# Patient Record
Sex: MALE | Race: WHITE | URBAN - METROPOLITAN AREA
[De-identification: names, ages, dates, MRNs, and addresses within clinical notes are randomized per-mention and may not be internally consistent; named-entity substitution may affect disease eponyms.]

---

## 2018-03-13 ENCOUNTER — DOCTOR'S OFFICE (OUTPATIENT)
Dept: URBAN - METROPOLITAN AREA CLINIC 125 | Facility: CLINIC | Age: 53
Setting detail: OPHTHALMOLOGY
End: 2018-03-13
Payer: COMMERCIAL

## 2018-03-13 ENCOUNTER — RX ONLY (RX ONLY)
Age: 53
End: 2018-03-13

## 2018-03-13 DIAGNOSIS — H10.233: ICD-10-CM

## 2018-03-13 PROCEDURE — 99203 OFFICE O/P NEW LOW 30 MIN: CPT | Performed by: OPHTHALMOLOGY

## 2018-03-13 ASSESSMENT — REFRACTION_MANIFEST
OU_VA: 20/
OS_VA2: 20/
OS_VA3: 20/
OS_VA1: 20/
OD_VA2: 20/
OD_VA1: 20/
OS_VA3: 20/
OD_VA1: 20/
OS_VA2: 20/
OD_VA2: 20/
OU_VA: 20/
OS_VA3: 20/
OS_VA1: 20/
OU_VA: 20/
OS_VA1: 20/
OS_VA2: 20/
OD_VA2: 20/
OD_VA3: 20/
OD_VA3: 20/
OD_VA1: 20/
OD_VA3: 20/

## 2018-03-13 ASSESSMENT — REFRACTION_CURRENTRX
OS_OVR_VA: 20/
OS_OVR_VA: 20/
OD_OVR_VA: 20/
OS_OVR_VA: 20/
OD_OVR_VA: 20/
OD_OVR_VA: 20/

## 2018-03-13 ASSESSMENT — VISUAL ACUITY
OD_BCVA: 20/50+2
OS_BCVA: 20/50

## 2018-03-13 ASSESSMENT — CONFRONTATIONAL VISUAL FIELD TEST (CVF)
OD_FINDINGS: FULL
OS_FINDINGS: FULL

## 2018-04-13 ENCOUNTER — DOCTOR'S OFFICE (OUTPATIENT)
Dept: URBAN - METROPOLITAN AREA CLINIC 125 | Facility: CLINIC | Age: 53
Setting detail: OPHTHALMOLOGY
End: 2018-04-13
Payer: COMMERCIAL

## 2018-04-13 DIAGNOSIS — H25.13: ICD-10-CM

## 2018-04-13 DIAGNOSIS — H43.812: ICD-10-CM

## 2018-04-13 DIAGNOSIS — H35.033: ICD-10-CM

## 2018-04-13 DIAGNOSIS — H43.811: ICD-10-CM

## 2018-04-13 DIAGNOSIS — H43.813: ICD-10-CM

## 2018-04-13 DIAGNOSIS — H10.233: ICD-10-CM

## 2018-04-13 PROCEDURE — 92225 OPHTHALMOSCOPY EXTENDED INITIAL: CPT | Performed by: OPHTHALMOLOGY

## 2018-04-13 PROCEDURE — 99214 OFFICE O/P EST MOD 30 MIN: CPT | Performed by: OPHTHALMOLOGY

## 2018-04-13 ASSESSMENT — VISUAL ACUITY
OD_BCVA: 20/40
OS_BCVA: 20/50

## 2018-04-13 ASSESSMENT — REFRACTION_MANIFEST
OS_VA1: 20/
OS_VA2: 20/
OD_VA1: 20/
OD_VA1: 20/
OS_VA3: 20/
OU_VA: 20/
OD_VA3: 20/
OS_VA2: 20/
OU_VA: 20/
OU_VA: 20/
OD_VA3: 20/
OS_VA3: 20/
OD_VA3: 20/
OS_VA3: 20/
OS_VA1: 20/
OD_VA2: 20/
OD_VA2: 20/
OS_VA1: 20/
OD_VA1: 20/
OS_VA2: 20/
OD_VA2: 20/

## 2018-04-13 ASSESSMENT — REFRACTION_CURRENTRX
OD_OVR_VA: 20/
OS_OVR_VA: 20/

## 2018-04-13 ASSESSMENT — REFRACTION_AUTOREFRACTION
OD_AXIS: 135
OS_SPHERE: -0.50
OD_SPHERE: -0.75
OD_CYLINDER: -0.50
OS_CYLINDER: SPH

## 2018-04-13 ASSESSMENT — CONFRONTATIONAL VISUAL FIELD TEST (CVF)
OS_FINDINGS: FULL
OD_FINDINGS: FULL

## 2018-04-13 ASSESSMENT — SPHEQUIV_DERIVED: OD_SPHEQUIV: -1

## 2018-04-24 ENCOUNTER — DOCTOR'S OFFICE (OUTPATIENT)
Dept: URBAN - METROPOLITAN AREA CLINIC 125 | Facility: CLINIC | Age: 53
Setting detail: OPHTHALMOLOGY
End: 2018-04-24
Payer: COMMERCIAL

## 2018-04-24 ENCOUNTER — RX ONLY (RX ONLY)
Age: 53
End: 2018-04-24

## 2018-04-24 DIAGNOSIS — H52.13: ICD-10-CM

## 2018-04-24 PROBLEM — H10.233: Status: ACTIVE | Noted: 2018-03-13

## 2018-04-24 PROBLEM — H35.033 HYPERTENSIVE RETINOPATHY; BOTH EYES: Status: ACTIVE | Noted: 2018-04-13

## 2018-04-24 PROBLEM — H43.812 POST VITREOUS DETACHMENT; RIGHT EYE, LEFT EYE: Status: ACTIVE | Noted: 2018-04-13

## 2018-04-24 PROBLEM — H43.811 POST VITREOUS DETACHMENT; RIGHT EYE, LEFT EYE: Status: ACTIVE | Noted: 2018-04-13

## 2018-04-24 PROBLEM — H25.13 CATARACT NUCLEAR SCLEROSIS; BOTH EYES: Status: ACTIVE | Noted: 2018-04-13

## 2018-04-24 PROCEDURE — 92015 DETERMINE REFRACTIVE STATE: CPT | Performed by: OPTOMETRIST

## 2018-04-24 ASSESSMENT — REFRACTION_MANIFEST
OD_SPHERE: -0.50
OD_VA2: 20/
OD_VA2: 20/
OS_ADD: +2.50
OS_VA3: 20/
OS_SPHERE: -0.75
OS_VA1: 20/25+2
OS_VA3: 20/
OD_ADD: +2.50
OS_VA2: 20/
OS_VA1: 20/
OU_VA: 20/
OS_CYLINDER: SPH
OD_CYLINDER: SPH
OD_VA3: 20/
OD_VA1: 20/
OS_VA1: 20/
OD_VA1: 20/
OS_VA2: 20/
OD_VA1: 20/25-2
OD_VA2: 20/
OU_VA: 20/
OD_VA3: 20/
OS_VA3: 20/
OU_VA: 20/
OD_VA3: 20/
OS_VA2: 20/

## 2018-04-24 ASSESSMENT — REFRACTION_AUTOREFRACTION
OD_AXIS: 165
OS_SPHERE: -0.50
OS_CYLINDER: -0.25
OS_AXIS: 008
OD_CYLINDER: -1.00
OD_SPHERE: -0.75

## 2018-04-24 ASSESSMENT — REFRACTION_CURRENTRX
OS_OVR_VA: 20/
OD_OVR_VA: 20/
OD_OVR_VA: 20/
OS_OVR_VA: 20/
OD_OVR_VA: 20/
OS_OVR_VA: 20/

## 2018-04-24 ASSESSMENT — KERATOMETRY
OS_K1POWER_DIOPTERS: 47.50
OS_K2POWER_DIOPTERS: 48.75
OD_AXISANGLE_DEGREES: 005
OD_K1POWER_DIOPTERS: 47.00
OD_K2POWER_DIOPTERS: 47.25
OS_AXISANGLE_DEGREES: 120

## 2018-04-24 ASSESSMENT — SPHEQUIV_DERIVED
OD_SPHEQUIV: -1.25
OS_SPHEQUIV: -0.625

## 2018-04-24 ASSESSMENT — AXIALLENGTH_DERIVED
OD_AL: 22.7766
OS_AL: 22.2212

## 2018-04-24 ASSESSMENT — VISUAL ACUITY
OD_BCVA: 20/30-1
OS_BCVA: 20/30+2

## 2021-03-09 NOTE — TELEPHONE ENCOUNTER
Patient called to schedule an appt with a Orthopedic, I was unaware that the patient  had already had hip surgery in the past, Dr Inez Alcocer is going to review his notes when patient sends them  I called pt and cancelled his appt that I had made for him